# Patient Record
(demographics unavailable — no encounter records)

---

## 2025-02-14 NOTE — PROCEDURE
[Today's Date:] : Date: [unfilled] [Patient] : the patient [Risks] : risks [Benefits] : benefits [Consent Obtained] : written consent was obtained prior to the procedure and is detailed in the patient's record [Therapeutic] : therapeutic [#1 Site: ______] : #1 site identified in the [unfilled] [Ethyl Chloride] : ethyl chloride [Chlorhexidine] : chlorhexidine [22 gauge 1.5 inch] : A 22 gauge 1.5 inch needle was used [Tolerated Well] : the patient tolerated the procedure well [No Complications] : there were no complications [Instructions Given] : handouts/patient instructions were given to patient [Patient Instructed to Call] : patient was instructed to call if redness at site, a decrease in range of motion or an increase in pain is noted after procedure. [#2 Site: ___] : # 2 site identified in the [unfilled] [de-identified] : gelone 3 ML buy and bill

## 2025-03-04 NOTE — ASSESSMENT
[FreeTextEntry1] : I have discussed the situation w/ the patient, his daughter who was in the exam room and with his son who is a Pulmonologist. We discussed: 1. Anemia, iron deficiency, constipation point to colon malignancy.   2. Worsening renal function (by history) and 3. Low blood pressure w/ edema point to hemodynamic compromise, perhaps related to the anemia or/and to worsening LV function. The son would like to proceed with admission to [facility] for evaluation, prognosis and possible treatment options.  I have agreed with him. His daughter has agreed to bring the patient to [facility] ED tomorrow morning.  I have notified Dr. Iftikhar Rayo (Medical Director of [facility]) and Dr. Ender Dash, nephrology consult.

## 2025-03-04 NOTE — PHYSICAL EXAM
[General Appearance - In No Acute Distress] : in no acute distress [Jugular Venous Distention Increased] : there was no jugular-venous distention [Bibasilar Rales/Crackles] : bibasilar rales [Heart Sounds Pericardial Friction Rub] : no pericardial rub [Irregularly Irregular] : the rhythm was irregularly irregular [___ +] : bilateral [unfilled]+ pretibial pitting edema [Abdomen Tenderness] : non-tender [No Spinal Tenderness] : no spinal tenderness [FreeTextEntry1] : Walks w/ rolling waker [] : no rash [Oriented To Time, Place, And Person] : oriented to person, place, and time

## 2025-03-04 NOTE — HISTORY OF PRESENT ILLNESS
[FreeTextEntry1] : The daughter reports the following. His knee situation has gotten worse and he has not improved from acupuncture, PT and CBD. In addition, his last labs done by Dr. Ruvalcaba (not available) showed worsening renal function, iron deficiency with worsening anemia and a high CEA. He is also constipated. His PCP found him to have severe lower extremity edema and has increased the dose of diuretics. The edema has improved. Appetite remains good.  The patient endorses worsening weakness, no chest pain.  He is here for follow up.

## 2025-03-04 NOTE — REASON FOR VISIT
[Follow-Up] : a follow-up visit [Family Member] : family member [FreeTextEntry1] : First visit since his appointment of February 3, 2023.

## 2025-04-21 NOTE — ASSESSMENT
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   Plan  Labs to measure disease activity and monitor for drug toxicities  Allopurinol 200mg for now Colchicine 0.6 mg qother day b/l GC injections given in knees RTO 3-4 months

## 2025-04-21 NOTE — ASSESSMENT
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   Plan  Labs to measure disease activity and monitor for drug toxicities  Allopurinol 200mg for now Colchicine 0.6 mg qother day b/l GC injections given in knees RTO 3-4 months

## 2025-04-21 NOTE — HISTORY OF PRESENT ILLNESS
[___ Month(s) Ago] : [unfilled] month(s) ago [FreeTextEntry1] : =pt allopurinol 200mg daily, colchicine 0.6 mg every other day =no gout flare, no skin lesions =no fevers, SOB, CP, abdominal pain, n/v/d, rashes

## 2025-04-21 NOTE — PROCEDURE
[Today's Date:] : Date: [unfilled] [Patient] : the patient [Risks] : risks [Benefits] : benefits [Consent Obtained] : written consent was obtained prior to the procedure and is detailed in the patient's record [Therapeutic] : therapeutic [#1 Site: ______] : #1 site identified in the [unfilled] [Chlorhexidine] : chlorhexidine [22 gauge 1.5 inch] : A 22 gauge 1.5 inch needle was used [Depomedrol ___ mg] : Depomedrol [unfilled] mg [Tolerated Well] : the patient tolerated the procedure well [No Complications] : there were no complications [Instructions Given] : handouts/patient instructions were given to patient [Patient Instructed to Call] : patient was instructed to call if redness at site, a decrease in range of motion or an increase in pain is noted after procedure. [#2 Site: ___] : # 2 site identified in the [unfilled]

## 2025-04-21 NOTE — PHYSICAL EXAM
[Sclera] : the sclera and conjunctiva were normal [Abdomen Soft] : soft [Abdomen Tenderness] : non-tender [] : no rash [Oriented To Time, Place, And Person] : oriented to person, place, and time [Musculoskeletal - Swelling] : no joint swelling seen [FreeTextEntry1] : strength 5/5 X 4 extremeties

## 2025-07-14 NOTE — HISTORY OF PRESENT ILLNESS
[FreeTextEntry1] : Patient is a 96-year-old male with history significant for A-fib, chronic kidney disease, hypertension, hyperlipidemia diabetes, CVA, DVT/PE status post IVC filter, and peripheral vascular disease status post left femoropopliteal bypass with known thrombosis who presents the office today for evaluation of bilateral lower extremity edema.  Patient reports worsening edema in the lower extremities associated with discoloration in the left lower extremity.  Denies fever.  Denies chest pain or shortness of breath.  Denies rest pain or claudication symptoms.  Denies active tissue loss.  No history of smoking.

## 2025-07-14 NOTE — CONSULT LETTER
[Dear  ___] : Dear  [unfilled], [Consult Letter:] : I had the pleasure of evaluating your patient, [unfilled]. [Please see my note below.] : Please see my note below. [Consult Closing:] : Thank you very much for allowing me to participate in the care of this patient.  If you have any questions, please do not hesitate to contact me. [Sincerely,] : Sincerely, [FreeTextEntry3] : Chad Lowe M.D., F.IMER., R.P.ANDREI.  of Vascular Surgery Assistant Professor of Radiology Director of Endovascular Program/ [facility]

## 2025-07-14 NOTE — ASSESSMENT
[Arterial/Venous Disease] : arterial/venous disease [Medication Management] : medication management [FreeTextEntry1] : 96-year-old male with history of peripheral vascular disease with known occlusion of left femoropopliteal bypass.  PVRs show stable tibial arterial disease and bilateral lower extremities. No rest pain or tissue loss.  Patient to continue conservative management with Xarelto.  Patient with bilateral lower extremity edema.  Recommend light compression and leg elevation.  Patient to return to office for venous duplex of bilateral lower extremities.

## 2025-07-14 NOTE — PHYSICAL EXAM
[Normal Breath Sounds] : Normal breath sounds [2+] : left 2+ [Ankle Swelling (On Exam)] : present [Ankle Swelling Bilaterally] : severe [] : of the left leg [Ankle Swelling On The Left] : moderate [No Rash or Lesion] : No rash or lesion [Alert] : alert [Calm] : calm [Varicose Veins Of Lower Extremities] : not present [Skin Ulcer] : no ulcer [de-identified] : No acute distress noted [de-identified] : No palpable cords.  No calf tenderness. [de-identified] : Intact

## 2025-07-21 NOTE — ASSESSMENT
[FreeTextEntry1] : Programmed hearing aids to most recent programming session. Patient comfortable in the office. Paired to cell phone.  Could not pair to TV adaptor, which was at home. Provided written instructions and [employer] connection phone number that patient could provide to his daughter to help with pairing. Patient happy with today's services.   REC: [facility] with annual audio

## 2025-07-21 NOTE — HISTORY OF PRESENT ILLNESS
[FreeTextEntry1] : 96 year old male, with bilateral SNHL. Pt currently fit with binaural [employer] 2 MR-R hearing aids,  9/4/2025. [FreeTextEntry8] : Patient seen for dispensing of repaired hearing aids and molds. Mold retained same serial numbers. Hearing aids have new serial numbers. New SN [id number] replaced [id number] on right side and [id number] replaced [id number] on left side.